# Patient Record
Sex: MALE | Race: WHITE | NOT HISPANIC OR LATINO
[De-identification: names, ages, dates, MRNs, and addresses within clinical notes are randomized per-mention and may not be internally consistent; named-entity substitution may affect disease eponyms.]

---

## 2022-06-16 PROBLEM — Z00.00 ENCOUNTER FOR PREVENTIVE HEALTH EXAMINATION: Status: ACTIVE | Noted: 2022-06-16

## 2022-06-20 ENCOUNTER — APPOINTMENT (OUTPATIENT)
Dept: NEUROSURGERY | Facility: CLINIC | Age: 62
End: 2022-06-20
Payer: COMMERCIAL

## 2022-06-20 VITALS — WEIGHT: 229 LBS | HEIGHT: 72 IN | BODY MASS INDEX: 31.02 KG/M2

## 2022-06-20 DIAGNOSIS — Z78.9 OTHER SPECIFIED HEALTH STATUS: ICD-10-CM

## 2022-06-20 DIAGNOSIS — F17.200 NICOTINE DEPENDENCE, UNSPECIFIED, UNCOMPLICATED: ICD-10-CM

## 2022-06-20 DIAGNOSIS — M46.1 SACROILIITIS, NOT ELSEWHERE CLASSIFIED: ICD-10-CM

## 2022-06-20 DIAGNOSIS — M48.061 SPINAL STENOSIS, LUMBAR REGION WITHOUT NEUROGENIC CLAUDICATION: ICD-10-CM

## 2022-06-20 DIAGNOSIS — M47.816 SPONDYLOSIS W/OUT MYELOPATHY OR RADICULOPATHY, LUMBAR REGION: ICD-10-CM

## 2022-06-20 PROCEDURE — 99204 OFFICE O/P NEW MOD 45 MIN: CPT

## 2022-06-20 RX ORDER — AMOXICILLIN 500 MG/1
500 CAPSULE ORAL
Qty: 21 | Refills: 0 | Status: ACTIVE | COMMUNITY
Start: 2022-04-02

## 2022-06-20 RX ORDER — TEMAZEPAM 15 MG/1
15 CAPSULE ORAL
Qty: 30 | Refills: 0 | Status: ACTIVE | COMMUNITY
Start: 2022-02-08

## 2022-06-20 RX ORDER — RIVAROXABAN 20 MG/1
20 TABLET, FILM COATED ORAL
Qty: 30 | Refills: 0 | Status: ACTIVE | COMMUNITY
Start: 2022-06-14

## 2022-06-20 RX ORDER — HYDROCODONE BITARTRATE AND ACETAMINOPHEN 10; 325 MG/1; MG/1
10-325 TABLET ORAL
Qty: 21 | Refills: 0 | Status: ACTIVE | COMMUNITY
Start: 2022-06-08

## 2022-06-20 RX ORDER — GABAPENTIN 300 MG/1
300 CAPSULE ORAL
Qty: 90 | Refills: 0 | Status: ACTIVE | COMMUNITY
Start: 2022-02-08

## 2022-06-20 RX ORDER — METHYLPREDNISOLONE 4 MG/1
4 TABLET ORAL
Qty: 21 | Refills: 0 | Status: ACTIVE | COMMUNITY
Start: 2022-05-03

## 2022-06-20 RX ORDER — AZITHROMYCIN 250 MG/1
250 TABLET, FILM COATED ORAL
Qty: 6 | Refills: 0 | Status: ACTIVE | COMMUNITY
Start: 2022-02-05

## 2022-06-20 RX ORDER — PREGABALIN 100 MG/1
100 CAPSULE ORAL
Qty: 90 | Refills: 0 | Status: ACTIVE | COMMUNITY
Start: 2021-09-21

## 2022-06-20 RX ORDER — HYDROCHLOROTHIAZIDE 12.5 MG/1
12.5 CAPSULE ORAL
Qty: 30 | Refills: 0 | Status: ACTIVE | COMMUNITY
Start: 2022-05-24

## 2022-06-20 RX ORDER — ACETAMINOPHEN AND CODEINE 300; 30 MG/1; MG/1
300-30 TABLET ORAL
Qty: 20 | Refills: 0 | Status: ACTIVE | COMMUNITY
Start: 2022-02-05

## 2022-06-20 RX ORDER — METHADONE HYDROCHLORIDE 10 MG/1
10 TABLET ORAL
Qty: 60 | Refills: 0 | Status: ACTIVE | COMMUNITY
Start: 2022-02-08

## 2022-06-20 RX ORDER — BACLOFEN 10 MG/1
10 TABLET ORAL
Qty: 90 | Refills: 0 | Status: ACTIVE | COMMUNITY
Start: 2022-02-08

## 2022-06-20 RX ORDER — IBUPROFEN 800 MG/1
800 TABLET, FILM COATED ORAL
Qty: 90 | Refills: 0 | Status: ACTIVE | COMMUNITY
Start: 2022-02-08

## 2022-06-20 RX ORDER — CELECOXIB 400 MG/1
400 CAPSULE ORAL
Qty: 60 | Refills: 0 | Status: ACTIVE | COMMUNITY
Start: 2022-04-05

## 2022-06-22 NOTE — ASSESSMENT
[FreeTextEntry1] : Mr. MENDEZ is found to have right L5-S1 facet arthropathy with foraminal stenosis.  His symptoms are lower back with bilateral lower extremity pain.  This may be due to lumbar facet arthropathy versus SI joint dysfunction.\par \par Continuation of conservative management.  He will see pain management for lumbar facet blocks, medial branch blocks / RFA, SI joint injections and possibly spinal cord stimulator trial. \par \par

## 2022-06-22 NOTE — HISTORY OF PRESENT ILLNESS
[de-identified] : Mr. MENDEZ has a longstanding history of lower back pain with radiation to the lower extremities.  He reports his pain worsens with prolonged standing and walking.  Sitting is fairly comfortable.  He is under the care of Dr. Lau for pain management.  Lumbar epidurals x2 gave him one week of temporary relief.\par \par He had an MRI of the lumbar spine at [facility] April 2022 this study was reviewed today (CD).  L5-S1 right facet arthropathy with foraminal stenosis.  Lumbar degenerative disc disease.\par \par PHYSICAL EXAM: \par Constitutional: Well appearing, no distress\par HEENT: Normocephalic Atraumatic\par Psychiatric: Alert and oriented to all spheres, normal mood\par Pulmonary: No respiratory distress\par \par Neurologic: \par CN II-XII grossly intact\par Palpation: + lumbar facet / SI joint tenderness. \par Strength: Full strength in all major muscle groups\par Sensation: Full sensation to light touch in all extremities\par Reflexes: \par  2+ patellar\par  2+ ankle jerk\par Restriction LS spine. \par Positive provocative testing consisting of hector test, compression testing, thigh thrust, gaenslen, and distraction testing.\par SLR negative\par Gait: steady, walking w/o assistance. \par \par \par \par

## 2022-06-29 ENCOUNTER — NON-APPOINTMENT (OUTPATIENT)
Age: 62
End: 2022-06-29

## 2023-03-16 ENCOUNTER — APPOINTMENT (OUTPATIENT)
Dept: SURGERY | Facility: CLINIC | Age: 63
End: 2023-03-16
Payer: COMMERCIAL

## 2023-03-16 VITALS
TEMPERATURE: 97.3 F | DIASTOLIC BLOOD PRESSURE: 78 MMHG | HEIGHT: 72 IN | HEART RATE: 81 BPM | BODY MASS INDEX: 29.12 KG/M2 | WEIGHT: 215 LBS | SYSTOLIC BLOOD PRESSURE: 102 MMHG | OXYGEN SATURATION: 96 %

## 2023-03-16 PROCEDURE — 99203 OFFICE O/P NEW LOW 30 MIN: CPT

## 2023-03-16 NOTE — HISTORY OF PRESENT ILLNESS
[de-identified] :  the patient comes with his wife to be evaluated for a tender mass in the area of his left breast.  He first noticed this about 4 weeks ago and believes it is  enlarging and becoming more tender.  There is no history of local trauma, infection or surgery, and he denies any nipple discharge or other local changes.  He has no symptoms in the right breast and no prior history of any other breast disorders or breast surgery.  His review of systems for the development of gynecomastia is negative.  There is no family history of breast cancer.

## 2023-03-16 NOTE — ASSESSMENT
[FreeTextEntry1] :   The patient has mild bilateral gynecomastia, which has become symptomatic on the left.  The finding is not suspicious and there is no obvious etiology in terms of his medical history or medications.  The symptoms may be related to significant caffeine intake and he was encouraged to reduce this is much as possible, use NSAIDs for discomfort as needed, and return here in about 6 to 8 weeks for reexam, or sooner should the problem worsen in the interim.    If the area is persistent and remains symptomatic, excisional biopsy can be considered.\par \par He informs that he is also considering a bilateral reduction mammoplasty for cosmetic reasons and  it was discussed that this may be another solution to his complaint.\par \par   All their questions were answered and they are happy with the assessment and plan.

## 2023-03-16 NOTE — DATA REVIEWED
[FreeTextEntry1] :  recent bilateral mammogram and breast sonogram were negative, with no findings in the area of complaint.

## 2023-03-16 NOTE — PHYSICAL EXAM
[Normal Thyroid] : the thyroid was normal [Normal Breath Sounds] : Normal breath sounds [Normal Heart Sounds] : normal heart sounds [Normal Rate and Rhythm] : normal rate and rhythm [Abdominal Masses] : No abdominal masses [Abdomen Tenderness] : ~T ~M No abdominal tenderness [No HSM] : no hepatosplenomegaly [de-identified] :   Healthy [de-identified] :  no exophthalmos [de-identified] :  no adenopathy [de-identified] :  somewhat large and slightly pendulous but symmetrical, with no nipple discharge, nipple retraction, suspicious skin changes bilaterally.  Small areas of glandular breast tissue are palpable in each upper outer periareolar region, slightly more so on the left than the right, and somewhat tender on the left.  No other discrete masses and no suspicious areas are palpable in either breast.  No axillary adenopathy bilaterally. [de-identified] :   Healed circumareolar and transverse suprapubic incisions from his prior abdominoplasty.  No inguinal or femoral lymphadenopathy bilaterally. [de-identified] :   Testicles are normal in size and consistency, symmetrical and free of any palpable masses.

## 2023-03-16 NOTE — REASON FOR VISIT
[Initial Evaluation] : an initial evaluation [Spouse] : spouse [FreeTextEntry1] : Tender left breast mass

## 2023-05-18 ENCOUNTER — APPOINTMENT (OUTPATIENT)
Dept: SURGERY | Facility: CLINIC | Age: 63
End: 2023-05-18
Payer: COMMERCIAL

## 2023-05-18 VITALS
TEMPERATURE: 97 F | SYSTOLIC BLOOD PRESSURE: 124 MMHG | OXYGEN SATURATION: 97 % | DIASTOLIC BLOOD PRESSURE: 82 MMHG | BODY MASS INDEX: 29.12 KG/M2 | HEIGHT: 72 IN | HEART RATE: 76 BPM | WEIGHT: 215 LBS

## 2023-05-18 DIAGNOSIS — N63.20 UNSPECIFIED LUMP IN THE LEFT BREAST, UNSPECIFIED QUADRANT: ICD-10-CM

## 2023-05-18 DIAGNOSIS — N62 HYPERTROPHY OF BREAST: ICD-10-CM

## 2023-05-18 PROCEDURE — 99212 OFFICE O/P EST SF 10 MIN: CPT

## 2023-05-18 NOTE — PHYSICAL EXAM
[de-identified] :   Healthy [de-identified] :  no adenopathy [de-identified] :  bilateral breast examination is unchanged from before, with prominent and somewhat pendulous bilateral breasts, no discrete masses or local suspicious changes, mild to moderate bilateral gynecomastia which is tender, more so on the left than the right.  No axillary adenopathy bilaterally.

## 2023-05-18 NOTE — ASSESSMENT
[FreeTextEntry1] :   Benign but symptomatic bilateral gynecomastia.  The patient is scheduled for bilateral subcutaneous mastectomies and need not return here thereafter unless other issues or questions arise that require my attention.  He was asked to have the final pathology reports from the surgery forwarded here for my reference, and I believe that he will have a significant amount of symptomatic relief from the surgery after he has completed the healing process.  There are no suspicious findings at this time that warrant any further work-up prior to proceeding.  All their questions were answered and they are happy with the assessment and plan.

## 2023-05-18 NOTE — HISTORY OF PRESENT ILLNESS
[de-identified] : The patient returns with his wife for reevaluation of his bilateral gynecomastia.  He believes the area in the left breast may actually be somewhat worse than before, but he denies any other symptoms or changes in either breast region, and no new masses or nipple discharge.  He has been reevaluated by Dr. Rosales, and is scheduled for bilateral subcutaneous mastectomies in his office based OR on June 6.